# Patient Record
Sex: MALE | Race: WHITE | NOT HISPANIC OR LATINO | Employment: OTHER | URBAN - METROPOLITAN AREA
[De-identification: names, ages, dates, MRNs, and addresses within clinical notes are randomized per-mention and may not be internally consistent; named-entity substitution may affect disease eponyms.]

---

## 2023-03-07 LAB
ALANINE AMINOTRANSFERASE (SGPT) (U/L) IN SER/PLAS: 5 U/L (ref 10–52)
ALBUMIN (G/DL) IN SER/PLAS: 4.1 G/DL (ref 3.4–5)
ALKALINE PHOSPHATASE (U/L) IN SER/PLAS: 64 U/L (ref 33–136)
ANION GAP IN SER/PLAS: 16 MMOL/L (ref 10–20)
APPEARANCE, URINE: NORMAL
ASCORBIC ACID: NORMAL MG/DL
ASPARTATE AMINOTRANSFERASE (SGOT) (U/L) IN SER/PLAS: 15 U/L (ref 9–39)
BASOPHILS (10*3/UL) IN BLOOD BY AUTOMATED COUNT: 0.04 X10E9/L (ref 0–0.1)
BASOPHILS/100 LEUKOCYTES IN BLOOD BY AUTOMATED COUNT: 0.5 % (ref 0–2)
BILIRUBIN TOTAL (MG/DL) IN SER/PLAS: 0.6 MG/DL (ref 0–1.2)
BILIRUBIN, URINE: NORMAL
BLOOD, URINE: NORMAL
CALCIUM (MG/DL) IN SER/PLAS: 9.2 MG/DL (ref 8.6–10.3)
CARBON DIOXIDE, TOTAL (MMOL/L) IN SER/PLAS: 28 MMOL/L (ref 21–32)
CHLORIDE (MMOL/L) IN SER/PLAS: 101 MMOL/L (ref 98–107)
CHOLESTEROL (MG/DL) IN SER/PLAS: 155 MG/DL (ref 0–199)
CHOLESTEROL IN HDL (MG/DL) IN SER/PLAS: 69.2 MG/DL
CHOLESTEROL/HDL RATIO: 2.2
COLOR, URINE: NORMAL
CREATININE (MG/DL) IN SER/PLAS: 1.2 MG/DL (ref 0.5–1.3)
EOSINOPHILS (10*3/UL) IN BLOOD BY AUTOMATED COUNT: 0.19 X10E9/L (ref 0–0.4)
EOSINOPHILS/100 LEUKOCYTES IN BLOOD BY AUTOMATED COUNT: 2.5 % (ref 0–6)
ERYTHROCYTE DISTRIBUTION WIDTH (RATIO) BY AUTOMATED COUNT: 13.9 % (ref 11.5–14.5)
ERYTHROCYTE MEAN CORPUSCULAR HEMOGLOBIN CONCENTRATION (G/DL) BY AUTOMATED: 31.6 G/DL (ref 32–36)
ERYTHROCYTE MEAN CORPUSCULAR VOLUME (FL) BY AUTOMATED COUNT: 97 FL (ref 80–100)
ERYTHROCYTES (10*6/UL) IN BLOOD BY AUTOMATED COUNT: 4.44 X10E12/L (ref 4.5–5.9)
GFR MALE: 59 ML/MIN/1.73M2
GLUCOSE (MG/DL) IN SER/PLAS: 117 MG/DL (ref 74–99)
GLUCOSE, URINE: NORMAL
HEMATOCRIT (%) IN BLOOD BY AUTOMATED COUNT: 43 % (ref 41–52)
HEMOGLOBIN (G/DL) IN BLOOD: 13.6 G/DL (ref 13.5–17.5)
IMMATURE GRANULOCYTES/100 LEUKOCYTES IN BLOOD BY AUTOMATED COUNT: 0.1 % (ref 0–0.9)
KETONES, URINE: NORMAL
LDL: 70 MG/DL (ref 0–99)
LEUKOCYTE ESTERASE, URINE: NORMAL
LEUKOCYTES (10*3/UL) IN BLOOD BY AUTOMATED COUNT: 7.6 X10E9/L (ref 4.4–11.3)
LYMPHOCYTES (10*3/UL) IN BLOOD BY AUTOMATED COUNT: 1.16 X10E9/L (ref 0.8–3)
LYMPHOCYTES/100 LEUKOCYTES IN BLOOD BY AUTOMATED COUNT: 15.3 % (ref 13–44)
MONOCYTES (10*3/UL) IN BLOOD BY AUTOMATED COUNT: 0.62 X10E9/L (ref 0.05–0.8)
MONOCYTES/100 LEUKOCYTES IN BLOOD BY AUTOMATED COUNT: 8.2 % (ref 2–10)
NEUTROPHILS (10*3/UL) IN BLOOD BY AUTOMATED COUNT: 5.56 X10E9/L (ref 1.6–5.5)
NEUTROPHILS/100 LEUKOCYTES IN BLOOD BY AUTOMATED COUNT: 73.4 % (ref 40–80)
NITRITE, URINE: NORMAL
PH, URINE: NORMAL
PLATELETS (10*3/UL) IN BLOOD AUTOMATED COUNT: 269 X10E9/L (ref 150–450)
POTASSIUM (MMOL/L) IN SER/PLAS: 4.4 MMOL/L (ref 3.5–5.3)
PROSTATE SPECIFIC AG (NG/ML) IN SER/PLAS: 0.4 NG/ML (ref 0–4)
PROTEIN TOTAL: 6.1 G/DL (ref 6.4–8.2)
PROTEIN, URINE: NORMAL
SODIUM (MMOL/L) IN SER/PLAS: 141 MMOL/L (ref 136–145)
SPECIFIC GRAVITY, URINE: NORMAL
THYROTROPIN (MIU/L) IN SER/PLAS BY DETECTION LIMIT <= 0.05 MIU/L: 2.62 MIU/L (ref 0.44–3.98)
TRIGLYCERIDE (MG/DL) IN SER/PLAS: 80 MG/DL (ref 0–149)
UREA NITROGEN (MG/DL) IN SER/PLAS: 19 MG/DL (ref 6–23)
UROBILINOGEN, URINE: NORMAL
VLDL: 16 MG/DL (ref 0–40)

## 2023-06-14 LAB
ALBUMIN (MG/L) IN URINE: 110.8 MG/L
ALBUMIN/CREATININE (UG/MG) IN URINE: 118.1 UG/MG CRT (ref 0–30)
CREATININE (MG/DL) IN URINE: 93.8 MG/DL (ref 20–370)

## 2023-09-19 LAB
ALANINE AMINOTRANSFERASE (SGPT) (U/L) IN SER/PLAS: 5 U/L (ref 10–52)
ALBUMIN (G/DL) IN SER/PLAS: 4.2 G/DL (ref 3.4–5)
ALKALINE PHOSPHATASE (U/L) IN SER/PLAS: 64 U/L (ref 33–136)
ANION GAP IN SER/PLAS: 12 MMOL/L (ref 10–20)
ASPARTATE AMINOTRANSFERASE (SGOT) (U/L) IN SER/PLAS: 15 U/L (ref 9–39)
BILIRUBIN TOTAL (MG/DL) IN SER/PLAS: 0.7 MG/DL (ref 0–1.2)
CALCIUM (MG/DL) IN SER/PLAS: 9.2 MG/DL (ref 8.6–10.3)
CARBON DIOXIDE, TOTAL (MMOL/L) IN SER/PLAS: 29 MMOL/L (ref 21–32)
CHLORIDE (MMOL/L) IN SER/PLAS: 106 MMOL/L (ref 98–107)
CHOLESTEROL (MG/DL) IN SER/PLAS: 160 MG/DL (ref 0–199)
CHOLESTEROL IN HDL (MG/DL) IN SER/PLAS: 71.3 MG/DL
CHOLESTEROL/HDL RATIO: 2.2
CREATININE (MG/DL) IN SER/PLAS: 1.23 MG/DL (ref 0.5–1.3)
GFR MALE: 57 ML/MIN/1.73M2
GLUCOSE (MG/DL) IN SER/PLAS: 148 MG/DL (ref 74–99)
LDL: 74 MG/DL (ref 0–99)
POTASSIUM (MMOL/L) IN SER/PLAS: 4.4 MMOL/L (ref 3.5–5.3)
PROTEIN TOTAL: 6.3 G/DL (ref 6.4–8.2)
SODIUM (MMOL/L) IN SER/PLAS: 143 MMOL/L (ref 136–145)
TRIGLYCERIDE (MG/DL) IN SER/PLAS: 74 MG/DL (ref 0–149)
UREA NITROGEN (MG/DL) IN SER/PLAS: 22 MG/DL (ref 6–23)
VLDL: 15 MG/DL (ref 0–40)

## 2023-10-23 DIAGNOSIS — E11.65 TYPE 2 DIABETES MELLITUS WITH HYPERGLYCEMIA, WITHOUT LONG-TERM CURRENT USE OF INSULIN (MULTI): Primary | ICD-10-CM

## 2023-10-23 RX ORDER — GLIPIZIDE 2.5 MG/1
TABLET, EXTENDED RELEASE ORAL
Qty: 270 TABLET | Refills: 1 | Status: SHIPPED | OUTPATIENT
Start: 2023-10-23 | End: 2024-04-25

## 2023-11-01 RX ORDER — SIMVASTATIN 10 MG/1
1 TABLET, FILM COATED ORAL NIGHTLY
COMMUNITY
Start: 2008-05-21 | End: 2024-01-15 | Stop reason: SDUPTHER

## 2023-11-01 RX ORDER — METFORMIN HYDROCHLORIDE 500 MG/1
500 TABLET ORAL
COMMUNITY
Start: 2009-08-11 | End: 2024-02-27

## 2023-11-01 RX ORDER — MUPIROCIN 20 MG/G
OINTMENT TOPICAL
COMMUNITY
Start: 2023-09-08

## 2023-11-01 RX ORDER — SULFAMETHOXAZOLE AND TRIMETHOPRIM 400; 80 MG/1; MG/1
TABLET ORAL
COMMUNITY
Start: 2023-09-05 | End: 2023-12-13 | Stop reason: ALTCHOICE

## 2023-11-01 RX ORDER — BLOOD-GLUCOSE METER
EACH MISCELLANEOUS
COMMUNITY
Start: 2017-12-04 | End: 2024-04-01

## 2023-11-01 RX ORDER — CARBIDOPA AND LEVODOPA 25; 100 MG/1; MG/1
1 TABLET ORAL 4 TIMES DAILY
COMMUNITY
Start: 2019-06-19

## 2023-11-01 RX ORDER — CHOLECALCIFEROL (VITAMIN D3) 25 MCG
TABLET ORAL
COMMUNITY
Start: 2011-12-19

## 2023-11-01 RX ORDER — LISINOPRIL 10 MG/1
10 TABLET ORAL 2 TIMES DAILY
COMMUNITY
Start: 2015-02-18 | End: 2024-01-04 | Stop reason: SDUPTHER

## 2023-11-02 ENCOUNTER — OFFICE VISIT (OUTPATIENT)
Dept: PODIATRY | Facility: CLINIC | Age: 88
End: 2023-11-02
Payer: MEDICARE

## 2023-11-02 DIAGNOSIS — M79.675 TOE PAIN, LEFT: ICD-10-CM

## 2023-11-02 DIAGNOSIS — M79.674 TOE PAIN, RIGHT: ICD-10-CM

## 2023-11-02 DIAGNOSIS — E11.65 POORLY CONTROLLED DIABETES MELLITUS (MULTI): ICD-10-CM

## 2023-11-02 DIAGNOSIS — B35.1 TINEA UNGUIUM: Primary | ICD-10-CM

## 2023-11-02 PROCEDURE — 1036F TOBACCO NON-USER: CPT | Performed by: PODIATRIST

## 2023-11-02 PROCEDURE — 11721 DEBRIDE NAIL 6 OR MORE: CPT | Performed by: PODIATRIST

## 2023-11-02 PROCEDURE — 1160F RVW MEDS BY RX/DR IN RCRD: CPT | Performed by: PODIATRIST

## 2023-11-02 PROCEDURE — 1159F MED LIST DOCD IN RCRD: CPT | Performed by: PODIATRIST

## 2023-11-02 NOTE — PROGRESS NOTES
CC:  painful thickened and elongated toenails and diabetic care.     HPI: Pt presents for dm foot exam and painful thickened and elongated toenails that are difficult to manage.  Onset was gradual with worsening course until recently.  Aggravated by shoe gear and ambulation.       PCP: Dr. Semaan  Last visit: 9-18-23     PMH  History reviewed. No pertinent past medical history.  MEDS    Current Outpatient Medications:     blood sugar diagnostic (OneTouch Verio test strips) strip, TEST TWO TIMES A DAY NON-INSULIN  PER DIRECTED, Disp: , Rfl:     carbidopa-levodopa (Sinemet)  mg tablet, Take 1 tablet by mouth 4 times a day. TAKE PER DIRECTED, Disp: , Rfl:     cholecalciferol (Vitamin D-3) 25 MCG (1000 UT) tablet, Take by mouth. TAKE PER DIRECTED, Disp: , Rfl:     glipiZIDE XL (Glucotrol XL) 2.5 mg 24 hr tablet, 1 TAB PO Q AM AND #2 TABS PO AT SUPPER.  Do not crush, chew, or split., Disp: 270 tablet, Rfl: 1    lisinopril 10 mg tablet, Take 1 tablet (10 mg) by mouth 2 times a day. TAKE PER DIRECTED, Disp: , Rfl:     metFORMIN (Glucophage) 500 mg tablet, Take 1 tablet (500 mg) by mouth. TAKE PER DIRECTED, Disp: , Rfl:     multivitamin capsule, Take 1 capsule by mouth once daily., Disp: , Rfl:     mupirocin (Bactroban) 2 % ointment, Apply topically. APPLY PER DIRECTED, Disp: , Rfl:     simvastatin (Zocor) 10 mg tablet, Take 1 tablet (10 mg) by mouth once daily at bedtime., Disp: , Rfl:     sulfamethoxazole-trimethoprim (Bactrim) 400-80 mg tablet, Take by mouth. TAKE PER DIRECTED, Disp: , Rfl:   Allergies  No Known Allergies  Social History     Socioeconomic History    Marital status:      Spouse name: None    Number of children: None    Years of education: None    Highest education level: None   Occupational History    None   Tobacco Use    Smoking status: Never    Smokeless tobacco: Never   Substance and Sexual Activity    Alcohol use: Not Currently    Drug use: Never    Sexual activity: None   Other Topics  Concern    None   Social History Narrative    None     Social Determinants of Health     Financial Resource Strain: Not on file   Food Insecurity: Not on file   Transportation Needs: Not on file   Physical Activity: Not on file   Stress: Not on file   Social Connections: Not on file   Intimate Partner Violence: Not on file   Housing Stability: Not on file     Family History   Problem Relation Name Age of Onset    No Known Problems Other       History reviewed. No pertinent surgical history.    REVIEW OF SYSTEMS  DERM:   + as noted in HPI.       Physical examination:   On General Observation: Patient is a pleasant, cooperative, well developed 88 y.o. diabetic   adult male. The patient is alert and oriented to time, place and person. Patient has normal affect and mood.  There were no vitals taken for this visit.    Vascular:  DP and PT pulses are 2/4 b/l.  mild edema noted. mild varicosities b/l.  CFT  5 seconds to all digits bilateral.  Skin temperature is warm to warm from proximal to distal bilateral.      Muscular: Strength is 5/5 for all instrinsic and extrinsic muscle groups.     Neuro:  Proprioception present.   Sensation to vibration is  present. Protective sensation present  at all pedal sites via [place] Jyotsna 5.07 monofilament bilateral.  Light touch intact bilateral.     Derm:    Left toenails: 1-5 Brittleness, crumbling upon debridement, subungual debris, elongation, mycotic appearance, tenderness, and thickness.   Right toenails: 1-5 Brittleness, crumbling upon debridement, subungual debris, elongation, mycotic appearance, tenderness, and thickness.   Hair growth is decreased b/l le    ASSESSMENT:    Tinea Unguium [B35.1]   E11.65  Pain in right toe(s) [M79.674]   Pain in left toe(s) [M79.675]       PLAN:   A comprehensive history and physical examination were preformed. DM foot care and DM foot manifestations were reviewed.  The patient was educated on clinical findings, diagnosis and treatment  plans. Patient understands all that has been explained and all questions were answered to apparent satisfaction.     - Debrided toenails 1-10 in length and height.   - Follow up in 9-12 weeks.       Gerson Castellanos DPM

## 2023-12-13 ENCOUNTER — OFFICE VISIT (OUTPATIENT)
Dept: PRIMARY CARE | Facility: CLINIC | Age: 88
End: 2023-12-13
Payer: MEDICARE

## 2023-12-13 VITALS
RESPIRATION RATE: 16 BRPM | TEMPERATURE: 97 F | SYSTOLIC BLOOD PRESSURE: 122 MMHG | DIASTOLIC BLOOD PRESSURE: 80 MMHG | HEART RATE: 70 BPM | WEIGHT: 145 LBS

## 2023-12-13 DIAGNOSIS — G20.B2 PARKINSON'S DISEASE WITH DYSKINESIA AND FLUCTUATING MANIFESTATIONS (MULTI): ICD-10-CM

## 2023-12-13 DIAGNOSIS — I10 ESSENTIAL HYPERTENSION: ICD-10-CM

## 2023-12-13 DIAGNOSIS — E11.9 TYPE 2 DIABETES MELLITUS WITHOUT COMPLICATION, WITHOUT LONG-TERM CURRENT USE OF INSULIN (MULTI): Primary | ICD-10-CM

## 2023-12-13 LAB
POC FINGERSTICK BLOOD GLUCOSE: 155 MG/DL (ref 70–100)
POC HEMOGLOBIN A1C: 7.4 % (ref 4.2–6.5)

## 2023-12-13 PROCEDURE — 99213 OFFICE O/P EST LOW 20 MIN: CPT | Performed by: NURSE PRACTITIONER

## 2023-12-13 PROCEDURE — 1159F MED LIST DOCD IN RCRD: CPT | Performed by: NURSE PRACTITIONER

## 2023-12-13 PROCEDURE — 3079F DIAST BP 80-89 MM HG: CPT | Performed by: NURSE PRACTITIONER

## 2023-12-13 PROCEDURE — 82962 GLUCOSE BLOOD TEST: CPT | Performed by: NURSE PRACTITIONER

## 2023-12-13 PROCEDURE — 1036F TOBACCO NON-USER: CPT | Performed by: NURSE PRACTITIONER

## 2023-12-13 PROCEDURE — 1160F RVW MEDS BY RX/DR IN RCRD: CPT | Performed by: NURSE PRACTITIONER

## 2023-12-13 PROCEDURE — 3074F SYST BP LT 130 MM HG: CPT | Performed by: NURSE PRACTITIONER

## 2023-12-13 PROCEDURE — 83036 HEMOGLOBIN GLYCOSYLATED A1C: CPT | Performed by: NURSE PRACTITIONER

## 2023-12-13 RX ORDER — ASPIRIN 81 MG/1
81 TABLET ORAL DAILY
COMMUNITY

## 2023-12-13 ASSESSMENT — ENCOUNTER SYMPTOMS
ENDOCRINE NEGATIVE: 1
PSYCHIATRIC NEGATIVE: 1
GASTROINTESTINAL NEGATIVE: 1
CARDIOVASCULAR NEGATIVE: 1
CONSTITUTIONAL NEGATIVE: 1
NEUROLOGICAL NEGATIVE: 1
RESPIRATORY NEGATIVE: 1

## 2023-12-13 NOTE — PROGRESS NOTES
Seb Arias is a 88 y.o. here for a diabetic follow up exam.     Pt states they are doing well. Following a low carbohydrate diet and is active.     Up to date with eye and foot exams, pcp visits.     Last aic - 6.6  Goal aic advanced age - 7-8  Last wt 149  Last bmi 24.8   Urine micro slight c/w age 88 - 6/23    Meds:  Glipizide XL 2.5mg po qam  Metformin 500mg one po in am, 3 at supper    Dr soriano eye care and utd  Dr staples here podiatry care and utd  Pcp q6mo and utd  Parkinsonism - NP - Dr Campos in past - every 6mo  They has spoken in past re: strengthening PT program  Wife says he is on occas soft fall d/t foot getting caught, etc. Never syncopal  Will have him start PT    Aic 7.4%  Fbs 155  Fbs - 119, 122, 98, 128, 145, 231, 195, 116  Pp supper - 124, 178, 200x1    Lab Results   Component Value Date    POCGLU 155 (A) 12/13/2023    HGBA1C 7.4 (A) 12/13/2023    HGBA1C 7.5 02/23/2021    HGBA1C 7.5 08/26/2019        Vitals:    12/13/23 0852   Weight: 65.8 kg (145 lb)        Visit Vitals  /80   Pulse 70   Temp 36.1 °C (97 °F)   Resp 16        Lab Results   Component Value Date    HGBA1C 7.5 02/23/2021    HGBA1C 7.5 08/26/2019          Current Outpatient Medications on File Prior to Visit   Medication Sig Dispense Refill    blood sugar diagnostic (OneTouch Verio test strips) strip TEST TWO TIMES A DAY NON-INSULIN  PER DIRECTED      carbidopa-levodopa (Sinemet)  mg tablet Take 1 tablet by mouth 4 times a day. TAKE PER DIRECTED      cholecalciferol (Vitamin D-3) 25 MCG (1000 UT) tablet Take by mouth. TAKE PER DIRECTED      glipiZIDE XL (Glucotrol XL) 2.5 mg 24 hr tablet 1 TAB PO Q AM AND #2 TABS PO AT SUPPER.  Do not crush, chew, or split. 270 tablet 1    lisinopril 10 mg tablet Take 1 tablet (10 mg) by mouth 2 times a day. TAKE PER DIRECTED      metFORMIN (Glucophage) 500 mg tablet Take 1 tablet (500 mg) by mouth. TAKE PER DIRECTED      multivitamin capsule Take 1 capsule by mouth once daily.       mupirocin (Bactroban) 2 % ointment Apply topically. APPLY PER DIRECTED      simvastatin (Zocor) 10 mg tablet Take 1 tablet (10 mg) by mouth once daily at bedtime.      sulfamethoxazole-trimethoprim (Bactrim) 400-80 mg tablet Take by mouth. TAKE PER DIRECTED       No current facility-administered medications on file prior to visit.      Review of Systems   Constitutional: Negative.    Respiratory: Negative.     Cardiovascular: Negative.    Gastrointestinal: Negative.    Endocrine: Negative.    Skin: Negative.    Neurological: Negative.    Psychiatric/Behavioral: Negative.          Physical Exam  Vitals and nursing note reviewed.   Constitutional:       Appearance: Normal appearance.   HENT:      Head: Normocephalic.   Eyes:      Pupils: Pupils are equal, round, and reactive to light.   Cardiovascular:      Rate and Rhythm: Normal rate and regular rhythm.      Heart sounds: Normal heart sounds.   Pulmonary:      Effort: Pulmonary effort is normal.      Breath sounds: Normal breath sounds.   Skin:     General: Skin is warm and dry.   Neurological:      General: No focal deficit present.      Mental Status: He is alert and oriented to person, place, and time. Mental status is at baseline.   Psychiatric:         Mood and Affect: Mood normal.         Behavior: Behavior normal.         Thought Content: Thought content normal.         Judgment: Judgment normal.        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

## 2024-01-03 ENCOUNTER — EVALUATION (OUTPATIENT)
Dept: PHYSICAL THERAPY | Facility: CLINIC | Age: 89
End: 2024-01-03
Payer: MEDICARE

## 2024-01-03 DIAGNOSIS — G20.B2 PARKINSON'S DISEASE WITH DYSKINESIA AND FLUCTUATING MANIFESTATIONS (MULTI): Primary | ICD-10-CM

## 2024-01-03 PROCEDURE — 97116 GAIT TRAINING THERAPY: CPT | Performed by: PHYSICAL THERAPIST

## 2024-01-03 PROCEDURE — 97162 PT EVAL MOD COMPLEX 30 MIN: CPT | Performed by: PHYSICAL THERAPIST

## 2024-01-03 PROCEDURE — 97110 THERAPEUTIC EXERCISES: CPT | Performed by: PHYSICAL THERAPIST

## 2024-01-03 NOTE — PROGRESS NOTES
Physical Therapy     Physical Therapy Evaluation and Treatment      Patient Name:  Seb Arias   1935     Encounter Diagnosis   Name Primary?    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 LAURA SEAVER NP     SUBJECTIVE:        I DEVELOPED PARKINSONS  ABOUT 3-4 YEARS AGO AND IM HAVING DIFFICULTY WALKING AND GENERALLY MOVING AROUND--SOME FALLS     GOALS:   IMPROVE MY OVER ALL SAFETY AND WALKING     OBJECTIVE:   SHUFFLE GAIT PATTERN----LEG STRENGTH-3/5---GENERAL DEBILITY---FALLING OCCASSIONALLY     ASSESSMENT: DEBILITY WITH ALL GAIT    PLAN AND TREATMENT:  SEE FLOW SHEET PROGRAM IN CHART:    1 X WEEKLY

## 2024-01-04 ENCOUNTER — OFFICE VISIT (OUTPATIENT)
Dept: PODIATRY | Facility: CLINIC | Age: 89
End: 2024-01-04
Payer: MEDICARE

## 2024-01-04 DIAGNOSIS — M79.675 TOE PAIN, LEFT: ICD-10-CM

## 2024-01-04 DIAGNOSIS — M79.674 TOE PAIN, RIGHT: ICD-10-CM

## 2024-01-04 DIAGNOSIS — I10 PRIMARY HYPERTENSION: Primary | ICD-10-CM

## 2024-01-04 DIAGNOSIS — B35.1 TINEA UNGUIUM: Primary | ICD-10-CM

## 2024-01-04 DIAGNOSIS — E11.65 POORLY CONTROLLED DIABETES MELLITUS (MULTI): ICD-10-CM

## 2024-01-04 PROCEDURE — 11721 DEBRIDE NAIL 6 OR MORE: CPT | Performed by: PODIATRIST

## 2024-01-04 PROCEDURE — 1036F TOBACCO NON-USER: CPT | Performed by: PODIATRIST

## 2024-01-04 RX ORDER — LISINOPRIL 10 MG/1
10 TABLET ORAL 2 TIMES DAILY
Qty: 180 TABLET | Refills: 3 | Status: SHIPPED | OUTPATIENT
Start: 2024-01-04

## 2024-01-08 ENCOUNTER — TREATMENT (OUTPATIENT)
Dept: PHYSICAL THERAPY | Facility: CLINIC | Age: 89
End: 2024-01-08
Payer: MEDICARE

## 2024-01-08 DIAGNOSIS — G20.B2 PARKINSON'S DISEASE WITH DYSKINESIA AND FLUCTUATING MANIFESTATIONS (MULTI): Primary | ICD-10-CM

## 2024-01-08 PROCEDURE — 97110 THERAPEUTIC EXERCISES: CPT | Performed by: PHYSICAL THERAPIST

## 2024-01-08 PROCEDURE — 97140 MANUAL THERAPY 1/> REGIONS: CPT | Performed by: PHYSICAL THERAPIST

## 2024-01-08 PROCEDURE — 97116 GAIT TRAINING THERAPY: CPT | Performed by: PHYSICAL THERAPIST

## 2024-01-08 NOTE — PROGRESS NOTES
Physical Therapy     Physical Therapy Evaluation and Treatment      Patient Name:   Seb Arias      1935   Encounter Diagnosis   Name Primary?    Parkinson's disease with dyskinesia and fluctuating manifestations Yes        THERAPY VISIT NUMBER:     2    Today's Date:  1-8-24    REFERRING DR. LAURA SEAVER NP     SUBJECTIVE:        STILL HAVING WALKING DIFFICULTY     GOALS:  NO FALLS AND INCREASE MY WALKING     OBJECTIVE: SEE FLOW SHEET     ASSESSMENT: DEBILITY    PLAN AND TREATMENT:  SEE FLOW SHEET PROGRAM IN CHART:    1 X WEEKLY

## 2024-01-15 ENCOUNTER — TELEPHONE (OUTPATIENT)
Dept: PRIMARY CARE | Facility: CLINIC | Age: 89
End: 2024-01-15
Payer: MEDICARE

## 2024-01-15 DIAGNOSIS — E78.2 MIXED HYPERLIPIDEMIA: Primary | ICD-10-CM

## 2024-01-15 RX ORDER — SIMVASTATIN 10 MG/1
10 TABLET, FILM COATED ORAL NIGHTLY
Qty: 90 TABLET | Refills: 1 | Status: SHIPPED | OUTPATIENT
Start: 2024-01-15 | End: 2024-01-16 | Stop reason: SDUPTHER

## 2024-01-16 DIAGNOSIS — E78.2 MIXED HYPERLIPIDEMIA: ICD-10-CM

## 2024-01-16 RX ORDER — SIMVASTATIN 10 MG/1
10 TABLET, FILM COATED ORAL NIGHTLY
Qty: 90 TABLET | Refills: 1 | Status: SHIPPED | OUTPATIENT
Start: 2024-01-16

## 2024-01-16 NOTE — PROGRESS NOTES
Subjective   Patient ID: Seb Arias is a 88 y.o. male who presents for No chief complaint on file..  HPI    Review of Systems    Objective   Physical Exam    Assessment/Plan              .VS

## 2024-01-17 ENCOUNTER — APPOINTMENT (OUTPATIENT)
Dept: PHYSICAL THERAPY | Facility: CLINIC | Age: 89
End: 2024-01-17
Payer: MEDICARE

## 2024-01-18 ENCOUNTER — TREATMENT (OUTPATIENT)
Dept: PHYSICAL THERAPY | Facility: CLINIC | Age: 89
End: 2024-01-18
Payer: MEDICARE

## 2024-01-18 DIAGNOSIS — G20.B2 PARKINSON'S DISEASE WITH DYSKINESIA AND FLUCTUATING MANIFESTATIONS (MULTI): Primary | ICD-10-CM

## 2024-01-18 PROCEDURE — 97110 THERAPEUTIC EXERCISES: CPT | Performed by: PHYSICAL THERAPIST

## 2024-01-18 PROCEDURE — 97116 GAIT TRAINING THERAPY: CPT | Performed by: PHYSICAL THERAPIST

## 2024-01-18 PROCEDURE — 97140 MANUAL THERAPY 1/> REGIONS: CPT | Performed by: PHYSICAL THERAPIST

## 2024-01-18 NOTE — PROGRESS NOTES
Physical Therapy     Physical Therapy Evaluation and Treatment      Patient Name:  Seb Arias   1935     Encounter Diagnosis   Name Primary?    Parkinson's disease with dyskinesia and fluctuating manifestations Yes        THERAPY VISIT NUMBER:   3    Today's Date: 1-18-24    REFERRING DR. LAURA SEAVER NP     SUBJECTIVE:          MAKING PROGRESS---DOING A HOME PROGRAM     GOALS:    DEBILITY WITH WALKING --PARKINSONS     OBJECTIVE: SEE FLOW SHEET     ASSESSMENT:  MAKING PROGRESS    PLAN AND TREATMENT:  SEE FLOW SHEET PROGRAM IN CHART:    1 X WEEKLY

## 2024-01-24 ENCOUNTER — TREATMENT (OUTPATIENT)
Dept: PHYSICAL THERAPY | Facility: CLINIC | Age: 89
End: 2024-01-24
Payer: MEDICARE

## 2024-01-24 DIAGNOSIS — G20.B2 PARKINSON'S DISEASE WITH DYSKINESIA AND FLUCTUATING MANIFESTATIONS (MULTI): Primary | ICD-10-CM

## 2024-01-24 PROCEDURE — 97110 THERAPEUTIC EXERCISES: CPT | Performed by: PHYSICAL THERAPIST

## 2024-01-24 PROCEDURE — 97116 GAIT TRAINING THERAPY: CPT | Performed by: PHYSICAL THERAPIST

## 2024-01-31 ENCOUNTER — TREATMENT (OUTPATIENT)
Dept: PHYSICAL THERAPY | Facility: CLINIC | Age: 89
End: 2024-01-31
Payer: MEDICARE

## 2024-01-31 DIAGNOSIS — G20.B2 PARKINSON'S DISEASE WITH DYSKINESIA AND FLUCTUATING MANIFESTATIONS (MULTI): Primary | ICD-10-CM

## 2024-01-31 PROCEDURE — 97110 THERAPEUTIC EXERCISES: CPT | Performed by: PHYSICAL THERAPIST

## 2024-01-31 PROCEDURE — 97116 GAIT TRAINING THERAPY: CPT | Performed by: PHYSICAL THERAPIST

## 2024-01-31 PROCEDURE — 97140 MANUAL THERAPY 1/> REGIONS: CPT | Performed by: PHYSICAL THERAPIST

## 2024-01-31 NOTE — PROGRESS NOTES
Physical Therapy     Physical Therapy Evaluation and Treatment      Patient Name:  Seb Arias   1935     Encounter Diagnosis   Name Primary?    Parkinson's disease with dyskinesia and fluctuating manifestations Yes        THERAPY VISIT NUMBER:    5    Today's Date: 1-31-24    REFERRING DR. LAURA SEAVER NP     SUBJECTIVE:        I FEEL STRONGER IN MY WALKING ABILITIES     GOALS: SEE FLOW SHEET     OBJECTIVE:  MAKING INCREASED GAINS     ASSESSMENT: SEE ABOVE    PLAN AND TREATMENT:  SEE FLOW SHEET PROGRAM IN CHART:    1 X WEEKLY

## 2024-02-02 ENCOUNTER — TELEPHONE (OUTPATIENT)
Dept: PRIMARY CARE | Facility: CLINIC | Age: 89
End: 2024-02-02
Payer: MEDICARE

## 2024-02-02 DIAGNOSIS — E11.9 TYPE 2 DIABETES MELLITUS WITHOUT COMPLICATION, WITHOUT LONG-TERM CURRENT USE OF INSULIN (MULTI): Primary | ICD-10-CM

## 2024-02-02 RX ORDER — BLOOD-GLUCOSE CONTROL, NORMAL
100 EACH MISCELLANEOUS DAILY
Qty: 100 EACH | Refills: 1 | Status: SHIPPED | OUTPATIENT
Start: 2024-02-02 | End: 2024-02-07 | Stop reason: SDUPTHER

## 2024-02-02 RX ORDER — BLOOD-GLUCOSE CONTROL, NORMAL
100 EACH MISCELLANEOUS
COMMUNITY
End: 2024-02-02 | Stop reason: SDUPTHER

## 2024-02-07 DIAGNOSIS — E11.9 TYPE 2 DIABETES MELLITUS WITHOUT COMPLICATION, WITHOUT LONG-TERM CURRENT USE OF INSULIN (MULTI): ICD-10-CM

## 2024-02-07 RX ORDER — BLOOD-GLUCOSE CONTROL, NORMAL
100 EACH MISCELLANEOUS DAILY
Qty: 100 EACH | Refills: 3 | Status: SHIPPED | OUTPATIENT
Start: 2024-02-07

## 2024-02-07 RX ORDER — BLOOD-GLUCOSE CONTROL, NORMAL
100 EACH MISCELLANEOUS DAILY
Qty: 100 EACH | Refills: 3 | Status: SHIPPED | OUTPATIENT
Start: 2024-02-07 | End: 2024-02-07 | Stop reason: SDUPTHER

## 2024-02-07 NOTE — TELEPHONE ENCOUNTER
Pharmacy needing more info on script due to his insurance, I just added it on there can you resend please? Thank you

## 2024-02-08 ENCOUNTER — TREATMENT (OUTPATIENT)
Dept: PHYSICAL THERAPY | Facility: CLINIC | Age: 89
End: 2024-02-08
Payer: MEDICARE

## 2024-02-08 DIAGNOSIS — G20.B2 PARKINSON'S DISEASE WITH DYSKINESIA AND FLUCTUATING MANIFESTATIONS (MULTI): Primary | ICD-10-CM

## 2024-02-08 PROCEDURE — 97140 MANUAL THERAPY 1/> REGIONS: CPT | Performed by: PHYSICAL THERAPIST

## 2024-02-08 PROCEDURE — 97116 GAIT TRAINING THERAPY: CPT | Performed by: PHYSICAL THERAPIST

## 2024-02-08 PROCEDURE — 97110 THERAPEUTIC EXERCISES: CPT | Performed by: PHYSICAL THERAPIST

## 2024-02-08 NOTE — PROGRESS NOTES
Physical Therapy     Physical Therapy Evaluation and Treatment      Patient Name:  Seb Arias     1935   Encounter Diagnosis   Name Primary?    Parkinson's disease with dyskinesia and fluctuating manifestations Yes        THERAPY VISIT NUMBER:   6    Today's Date: 2-8-24    REFERRING DR. LAURA SEAVER NP     SUBJECTIVE:      TODAY WE WALKED OUTSIDE WITH ARM ON SHOULDER AND HAND---MOD ASST X1 ---WE WALKED TO THE MOVIE THEATER AND BACK       GOALS:  SEE ABOVE     OBJECTIVE: SEE FLOW SHEET     ASSESSMENT:  SEE FLOW SHEET    PLAN AND TREATMENT:  SEE FLOW SHEET PROGRAM IN CHART:    1 X WEEKLY

## 2024-02-15 ENCOUNTER — TREATMENT (OUTPATIENT)
Dept: PHYSICAL THERAPY | Facility: CLINIC | Age: 89
End: 2024-02-15
Payer: MEDICARE

## 2024-02-15 DIAGNOSIS — G20.B2 PARKINSON'S DISEASE WITH DYSKINESIA AND FLUCTUATING MANIFESTATIONS (MULTI): Primary | ICD-10-CM

## 2024-02-15 PROCEDURE — 97110 THERAPEUTIC EXERCISES: CPT | Performed by: PHYSICAL THERAPIST

## 2024-02-15 PROCEDURE — 97140 MANUAL THERAPY 1/> REGIONS: CPT | Performed by: PHYSICAL THERAPIST

## 2024-02-15 PROCEDURE — 97116 GAIT TRAINING THERAPY: CPT | Performed by: PHYSICAL THERAPIST

## 2024-02-15 NOTE — PROGRESS NOTES
Physical Therapy     Physical Therapy Evaluation and Treatment      Patient Name:  Seb Arias   1935     Encounter Diagnosis   Name Primary?    Parkinson's disease with dyskinesia and fluctuating manifestations Yes        THERAPY VISIT NUMBER:    7    Today's Date:  2-15-24    REFERRING DR. LAURA SEAVER NP     SUBJECTIVE:       GAIT DEBILITY---WITH PARKINSONS     GOALS:    CONTINUE EXERCISE AND STRENGHTENING     OBJECTIVE:   SEE FLOW SHEET     ASSESSMENT: DEBILITY    PLAN AND TREATMENT:  SEE FLOW SHEET PROGRAM IN CHART:    1 X WEEKLY

## 2024-02-26 DIAGNOSIS — E11.9 TYPE 2 DIABETES MELLITUS WITHOUT COMPLICATION, WITHOUT LONG-TERM CURRENT USE OF INSULIN (MULTI): Primary | ICD-10-CM

## 2024-02-27 RX ORDER — METFORMIN HYDROCHLORIDE 500 MG/1
TABLET ORAL
Qty: 360 TABLET | Refills: 3 | Status: SHIPPED | OUTPATIENT
Start: 2024-02-27

## 2024-02-29 ENCOUNTER — TREATMENT (OUTPATIENT)
Dept: PHYSICAL THERAPY | Facility: CLINIC | Age: 89
End: 2024-02-29
Payer: MEDICARE

## 2024-02-29 DIAGNOSIS — G20.B2 PARKINSON'S DISEASE WITH DYSKINESIA AND FLUCTUATING MANIFESTATIONS (MULTI): Primary | ICD-10-CM

## 2024-02-29 PROCEDURE — 97116 GAIT TRAINING THERAPY: CPT | Performed by: PHYSICAL THERAPIST

## 2024-02-29 PROCEDURE — 97110 THERAPEUTIC EXERCISES: CPT | Performed by: PHYSICAL THERAPIST

## 2024-02-29 PROCEDURE — 97140 MANUAL THERAPY 1/> REGIONS: CPT | Performed by: PHYSICAL THERAPIST

## 2024-02-29 NOTE — PROGRESS NOTES
Physical Therapy     Physical Therapy Evaluation and Treatment      Patient Name:  Seb Arias   1935      Encounter Diagnosis   Name Primary?    Parkinson's disease with dyskinesia and fluctuating manifestations Yes        THERAPY VISIT NUMBER:   8/10---2 more visits and then a home program    Today's Date: 2-29-24    REFERRING DR. LAURA SEAVER NP     SUBJECTIVE:        DEBILITY WITH SHUFFLE GAIT     GOALS: SEE ABOVE--LESS SHUFFLING      OBJECTIVE: SEE FLOW SHEET     ASSESSMENT: PARKINSONS GAIT    PLAN AND TREATMENT:  SEE FLOW SHEET PROGRAM IN CHART:    1 X WEEKLY FOR 2 MORE VISITS

## 2024-03-07 ENCOUNTER — TREATMENT (OUTPATIENT)
Dept: PHYSICAL THERAPY | Facility: CLINIC | Age: 89
End: 2024-03-07
Payer: MEDICARE

## 2024-03-07 ENCOUNTER — PROCEDURE VISIT (OUTPATIENT)
Dept: PODIATRY | Facility: CLINIC | Age: 89
End: 2024-03-07
Payer: MEDICARE

## 2024-03-07 DIAGNOSIS — M79.674 TOE PAIN, RIGHT: Primary | ICD-10-CM

## 2024-03-07 DIAGNOSIS — G20.B2 PARKINSON'S DISEASE WITH DYSKINESIA AND FLUCTUATING MANIFESTATIONS (MULTI): Primary | ICD-10-CM

## 2024-03-07 DIAGNOSIS — M79.675 TOE PAIN, LEFT: ICD-10-CM

## 2024-03-07 DIAGNOSIS — E11.65 POORLY CONTROLLED DIABETES MELLITUS (MULTI): ICD-10-CM

## 2024-03-07 DIAGNOSIS — B35.1 TINEA UNGUIUM: ICD-10-CM

## 2024-03-07 PROCEDURE — 11721 DEBRIDE NAIL 6 OR MORE: CPT | Performed by: PODIATRIST

## 2024-03-07 PROCEDURE — 97116 GAIT TRAINING THERAPY: CPT | Performed by: PHYSICAL THERAPIST

## 2024-03-07 PROCEDURE — 97110 THERAPEUTIC EXERCISES: CPT | Performed by: PHYSICAL THERAPIST

## 2024-03-07 NOTE — PROGRESS NOTES
CC:  painful thickened and elongated toenails and diabetic care.     HPI: Pt presents for dm foot exam and painful thickened and elongated toenails that are difficult to manage.  Onset was gradual with worsening course until recently.  Aggravated by shoe gear and ambulation.       PCP: Laura Seaver CNP  Last visit: 12-13-23     PMH  Past Medical History:   Diagnosis Date    Diabetes (CMS/[facility])      MEDS    Current Outpatient Medications:     aspirin 81 mg EC tablet, Take 1 tablet (81 mg) by mouth once daily., Disp: , Rfl:     blood sugar diagnostic (OneTouch Verio test strips) strip, TEST TWO TIMES A DAY NON-INSULIN  PER DIRECTED, Disp: , Rfl:     carbidopa-levodopa (Sinemet)  mg tablet, Take 1 tablet by mouth 4 times a day. TAKE PER DIRECTED, Disp: , Rfl:     cholecalciferol (Vitamin D-3) 25 MCG (1000 UT) tablet, Take by mouth. TAKE PER DIRECTED, Disp: , Rfl:     glipiZIDE XL (Glucotrol XL) 2.5 mg 24 hr tablet, 1 TAB PO Q AM AND #2 TABS PO AT SUPPER.  Do not crush, chew, or split., Disp: 270 tablet, Rfl: 1    lancets 30 gauge misc, 100 Lancets once daily., Disp: 100 each, Rfl: 3    lisinopril 10 mg tablet, Take 1 tablet (10 mg) by mouth 2 times a day. TAKE PER DIRECTED, Disp: 180 tablet, Rfl: 3    metFORMIN (Glucophage) 500 mg tablet, TAKE ONE TABLET BY MOUTH EVERY MORNING AND TAKE THREE TABLETS WITH SUPPER, Disp: 360 tablet, Rfl: 3    multivitamin capsule, Take 1 capsule by mouth once daily., Disp: , Rfl:     mupirocin (Bactroban) 2 % ointment, Apply topically. APPLY PER DIRECTED, Disp: , Rfl:     simvastatin (Zocor) 10 mg tablet, Take 1 tablet (10 mg) by mouth once daily at bedtime., Disp: 90 tablet, Rfl: 1  Allergies  No Known Allergies  Social History     Socioeconomic History    Marital status:      Spouse name: None    Number of children: None    Years of education: None    Highest education level: None   Occupational History    None   Tobacco Use    Smoking status: Never    Smokeless tobacco:  Never   Substance and Sexual Activity    Alcohol use: Not Currently    Drug use: Never    Sexual activity: None   Other Topics Concern    None   Social History Narrative    None     Social Determinants of Health     Financial Resource Strain: Not on file   Food Insecurity: Not on file   Transportation Needs: Not on file   Physical Activity: Not on file   Stress: Not on file   Social Connections: Not on file   Intimate Partner Violence: Not on file   Housing Stability: Not on file     Family History   Problem Relation Name Age of Onset    No Known Problems Other       No past surgical history on file.    REVIEW OF SYSTEMS  DERM:   + as noted in HPI.       Physical examination:   On General Observation: Patient is a pleasant, cooperative, well developed 88 y.o. diabetic   adult male. The patient is alert and oriented to time, place and person. Patient has normal affect and mood.  There were no vitals taken for this visit.    Vascular:  DP and PT pulses are 1-2/4 b/l.  mild edema noted. mild varicosities b/l.  CFT  6 seconds to all digits bilateral.  Skin temperature is warm to warm from proximal to distal bilateral.      Muscular: Strength is 5/5 for all instrinsic and extrinsic muscle groups.     Neuro:  Proprioception present.   Sensation to vibration is  present. Protective sensation present  at all pedal sites via [place] Jyotsna 5.07 monofilament bilateral.  Light touch present bilateral.     Derm:    Left toenails: 1-5 Brittleness, crumbling upon debridement, subungual debris, elongation, mycotic appearance, tenderness, and thickness.   Right toenails: 1-5 Brittleness, crumbling upon debridement, subungual debris, elongation, mycotic appearance, tenderness, and thickness.   Hair growth is decreased b/l le    ASSESSMENT:    Tinea Unguium [B35.1]   E11.65  Pain in right toe(s) [M79.674]   Pain in left toe(s) [M79.675]       PLAN:   DM foot care and DM foot manifestations were reviewed.  The patient was educated  on clinical findings, diagnosis and treatment plans. Patient understands all that has been explained and all questions were answered to apparent satisfaction.     - Debrided toenails 1-10 in length and height.   - Follow up in 9-12 weeks.       Gerson Castellanos DPM

## 2024-03-07 NOTE — PROGRESS NOTES
Physical Therapy      Physical Therapy Treatment      Patient Name: Seb Arias     MRN: [id number]     Today's Date: 3/7/24     REFERRING DR Seaman       SUBJECTIVE:  pt is doing better, his legs are stronger and so is his gait endurance             GOALS:  Safe ambulation and no falls          OBJECTIVE:  full tx now able to be on nustep for 33 min            ASSESSMENT: pt did well, his parkinsons gives him a flat affect , but he is stronger in terms of endurance. But his processing and body awareness have not changed, he is still at risk for falls, wife states he struggles to get out of rocking chair, but can get up from a standard arm chair. He follows [facility] but will benefit from PT in the future as he is very rigid in  all of his movements .          PLAN/TREATMENT/VISIT:     1 more visit planned

## 2024-03-14 ENCOUNTER — TREATMENT (OUTPATIENT)
Dept: PHYSICAL THERAPY | Facility: CLINIC | Age: 89
End: 2024-03-14
Payer: MEDICARE

## 2024-03-14 DIAGNOSIS — G20.B2 PARKINSON'S DISEASE WITH DYSKINESIA AND FLUCTUATING MANIFESTATIONS (MULTI): Primary | ICD-10-CM

## 2024-03-14 PROCEDURE — 97116 GAIT TRAINING THERAPY: CPT | Performed by: PHYSICAL THERAPIST

## 2024-03-14 PROCEDURE — 97110 THERAPEUTIC EXERCISES: CPT | Performed by: PHYSICAL THERAPIST

## 2024-03-14 PROCEDURE — 97140 MANUAL THERAPY 1/> REGIONS: CPT | Performed by: PHYSICAL THERAPIST

## 2024-03-14 NOTE — PROGRESS NOTES
Physical Therapy     Physical Therapy Evaluation and Treatment      Patient Name:   Seb Arias    1935      Encounter Diagnosis   Name Primary?    Parkinson's disease with dyskinesia and fluctuating manifestations Yes        THERAPY VISIT NUMBER:    10    Today's Date: 3-14-24    REFERRING DR. LAURA SEAVER NP     SUBJECTIVE:        PLAN---LAST VISIT TODAY---HE WILL NEED TO COME LATER THIS YEAR AGAIN---- SEE FLOW SHEET----     GOALS:    STILL HAVING PARKINSONS  ISSUES     OBJECTIVE:   SEE FLOW SHEET     ASSESSMENT:  DEBILITY    PLAN AND TREATMENT:  SEE FLOW SHEET PROGRAM IN CHART:    HOLD THERAPY---TO HEP----AND COME BACK LATER THIS YEAR

## 2024-03-25 ENCOUNTER — TELEPHONE (OUTPATIENT)
Dept: PRIMARY CARE | Facility: CLINIC | Age: 89
End: 2024-03-25
Payer: MEDICARE

## 2024-03-25 DIAGNOSIS — Z12.5 PROSTATE CANCER SCREENING: ICD-10-CM

## 2024-03-25 DIAGNOSIS — I10 PRIMARY HYPERTENSION: ICD-10-CM

## 2024-03-25 DIAGNOSIS — E11.9 TYPE 2 DIABETES MELLITUS WITHOUT COMPLICATION, WITHOUT LONG-TERM CURRENT USE OF INSULIN (MULTI): ICD-10-CM

## 2024-03-25 DIAGNOSIS — Z00.00 WELLNESS EXAMINATION: Primary | ICD-10-CM

## 2024-03-25 NOTE — TELEPHONE ENCOUNTER
Patient is calling in asking if you can you put in lab orders for him he has an appointment on 04/01/2024. Thank you.

## 2024-03-27 ENCOUNTER — LAB (OUTPATIENT)
Dept: LAB | Facility: LAB | Age: 89
End: 2024-03-27
Payer: MEDICARE

## 2024-03-27 DIAGNOSIS — Z00.00 WELLNESS EXAMINATION: ICD-10-CM

## 2024-03-27 DIAGNOSIS — N30.00 ACUTE CYSTITIS WITHOUT HEMATURIA: Primary | ICD-10-CM

## 2024-03-27 DIAGNOSIS — E11.9 TYPE 2 DIABETES MELLITUS WITHOUT COMPLICATION, WITHOUT LONG-TERM CURRENT USE OF INSULIN (MULTI): ICD-10-CM

## 2024-03-27 DIAGNOSIS — Z12.5 PROSTATE CANCER SCREENING: ICD-10-CM

## 2024-03-27 DIAGNOSIS — I10 PRIMARY HYPERTENSION: ICD-10-CM

## 2024-03-27 LAB
ALBUMIN SERPL BCP-MCNC: 4.1 G/DL (ref 3.4–5)
ALP SERPL-CCNC: 69 U/L (ref 33–136)
ALT SERPL W P-5'-P-CCNC: 3 U/L (ref 10–52)
ANION GAP SERPL CALC-SCNC: 9 MMOL/L (ref 10–20)
APPEARANCE UR: CLEAR
AST SERPL W P-5'-P-CCNC: 13 U/L (ref 9–39)
BILIRUB SERPL-MCNC: 0.6 MG/DL (ref 0–1.2)
BILIRUB UR STRIP.AUTO-MCNC: NEGATIVE MG/DL
BUN SERPL-MCNC: 27 MG/DL (ref 6–23)
CALCIUM SERPL-MCNC: 8.9 MG/DL (ref 8.6–10.3)
CHLORIDE SERPL-SCNC: 107 MMOL/L (ref 98–107)
CHOLEST SERPL-MCNC: 154 MG/DL (ref 0–199)
CHOLESTEROL/HDL RATIO: 2.2
CO2 SERPL-SCNC: 30 MMOL/L (ref 21–32)
COLOR UR: ABNORMAL
CREAT SERPL-MCNC: 1.25 MG/DL (ref 0.5–1.3)
EGFRCR SERPLBLD CKD-EPI 2021: 56 ML/MIN/1.73M*2
ERYTHROCYTE [DISTWIDTH] IN BLOOD BY AUTOMATED COUNT: 14.3 % (ref 11.5–14.5)
GLUCOSE SERPL-MCNC: 185 MG/DL (ref 74–99)
GLUCOSE UR STRIP.AUTO-MCNC: NORMAL MG/DL
HCT VFR BLD AUTO: 42.4 % (ref 41–52)
HDLC SERPL-MCNC: 70 MG/DL
HGB BLD-MCNC: 13.2 G/DL (ref 13.5–17.5)
HOLD SPECIMEN: NORMAL
KETONES UR STRIP.AUTO-MCNC: NEGATIVE MG/DL
LDLC SERPL CALC-MCNC: 71 MG/DL
LEUKOCYTE ESTERASE UR QL STRIP.AUTO: ABNORMAL
MCH RBC QN AUTO: 31.3 PG (ref 26–34)
MCHC RBC AUTO-ENTMCNC: 31.1 G/DL (ref 32–36)
MCV RBC AUTO: 101 FL (ref 80–100)
NITRITE UR QL STRIP.AUTO: NEGATIVE
NON HDL CHOLESTEROL: 84 MG/DL (ref 0–149)
NRBC BLD-RTO: 0 /100 WBCS (ref 0–0)
PH UR STRIP.AUTO: 5.5 [PH]
PLATELET # BLD AUTO: 241 X10*3/UL (ref 150–450)
POTASSIUM SERPL-SCNC: 4.4 MMOL/L (ref 3.5–5.3)
PROT SERPL-MCNC: 6.1 G/DL (ref 6.4–8.2)
PROT UR STRIP.AUTO-MCNC: ABNORMAL MG/DL
PSA SERPL-MCNC: 0.47 NG/ML
RBC # BLD AUTO: 4.22 X10*6/UL (ref 4.5–5.9)
RBC # UR STRIP.AUTO: NEGATIVE /UL
RBC #/AREA URNS AUTO: NORMAL /HPF
SODIUM SERPL-SCNC: 142 MMOL/L (ref 136–145)
SP GR UR STRIP.AUTO: 1.02
TRIGL SERPL-MCNC: 65 MG/DL (ref 0–149)
TSH SERPL-ACNC: 2.86 MIU/L (ref 0.44–3.98)
UROBILINOGEN UR STRIP.AUTO-MCNC: NORMAL MG/DL
VLDL: 13 MG/DL (ref 0–40)
WBC # BLD AUTO: 7.7 X10*3/UL (ref 4.4–11.3)
WBC #/AREA URNS AUTO: NORMAL /HPF

## 2024-03-27 PROCEDURE — 87186 SC STD MICRODIL/AGAR DIL: CPT

## 2024-03-27 PROCEDURE — 84443 ASSAY THYROID STIM HORMONE: CPT

## 2024-03-27 PROCEDURE — 36415 COLL VENOUS BLD VENIPUNCTURE: CPT

## 2024-03-27 PROCEDURE — 81001 URINALYSIS AUTO W/SCOPE: CPT

## 2024-03-27 PROCEDURE — 85027 COMPLETE CBC AUTOMATED: CPT

## 2024-03-27 PROCEDURE — G0103 PSA SCREENING: HCPCS

## 2024-03-27 PROCEDURE — 80053 COMPREHEN METABOLIC PANEL: CPT

## 2024-03-27 PROCEDURE — 80061 LIPID PANEL: CPT

## 2024-03-27 PROCEDURE — 87086 URINE CULTURE/COLONY COUNT: CPT

## 2024-03-29 RX ORDER — CEPHALEXIN 250 MG/1
250 CAPSULE ORAL 4 TIMES DAILY
Qty: 28 CAPSULE | Refills: 0 | Status: SHIPPED | OUTPATIENT
Start: 2024-03-29 | End: 2024-04-05

## 2024-03-30 DIAGNOSIS — E11.9 TYPE 2 DIABETES MELLITUS WITHOUT COMPLICATION, WITHOUT LONG-TERM CURRENT USE OF INSULIN (MULTI): Primary | ICD-10-CM

## 2024-04-01 ENCOUNTER — OFFICE VISIT (OUTPATIENT)
Dept: PRIMARY CARE | Facility: CLINIC | Age: 89
End: 2024-04-01
Payer: MEDICARE

## 2024-04-01 VITALS
OXYGEN SATURATION: 95 % | TEMPERATURE: 96.9 F | SYSTOLIC BLOOD PRESSURE: 122 MMHG | BODY MASS INDEX: 24.26 KG/M2 | HEIGHT: 65 IN | WEIGHT: 145.6 LBS | DIASTOLIC BLOOD PRESSURE: 80 MMHG | HEART RATE: 75 BPM

## 2024-04-01 DIAGNOSIS — R01.1 HEART MURMUR: ICD-10-CM

## 2024-04-01 DIAGNOSIS — N30.00 ACUTE CYSTITIS WITHOUT HEMATURIA: ICD-10-CM

## 2024-04-01 DIAGNOSIS — E11.9 TYPE 2 DIABETES MELLITUS WITHOUT COMPLICATION, WITHOUT LONG-TERM CURRENT USE OF INSULIN (MULTI): ICD-10-CM

## 2024-04-01 DIAGNOSIS — G20.B2 PARKINSON'S DISEASE WITH DYSKINESIA AND FLUCTUATING MANIFESTATIONS (MULTI): ICD-10-CM

## 2024-04-01 DIAGNOSIS — E78.2 MIXED HYPERLIPIDEMIA: ICD-10-CM

## 2024-04-01 DIAGNOSIS — Z00.00 MEDICARE ANNUAL WELLNESS VISIT, SUBSEQUENT: Primary | ICD-10-CM

## 2024-04-01 DIAGNOSIS — N64.4 MASTODYNIA OF RIGHT BREAST: ICD-10-CM

## 2024-04-01 DIAGNOSIS — D64.9 ANEMIA, UNSPECIFIED TYPE: ICD-10-CM

## 2024-04-01 DIAGNOSIS — I10 ESSENTIAL HYPERTENSION: ICD-10-CM

## 2024-04-01 DIAGNOSIS — Z12.5 PROSTATE CANCER SCREENING: ICD-10-CM

## 2024-04-01 DIAGNOSIS — M81.8 OTHER OSTEOPOROSIS, UNSPECIFIED PATHOLOGICAL FRACTURE PRESENCE: ICD-10-CM

## 2024-04-01 LAB
BACTERIA UR CULT: ABNORMAL
POC HEMOGLOBIN A1C: 7.6 % (ref 4.2–6.5)

## 2024-04-01 PROCEDURE — G0009 ADMIN PNEUMOCOCCAL VACCINE: HCPCS | Performed by: INTERNAL MEDICINE

## 2024-04-01 PROCEDURE — 83036 HEMOGLOBIN GLYCOSYLATED A1C: CPT | Performed by: INTERNAL MEDICINE

## 2024-04-01 PROCEDURE — 3074F SYST BP LT 130 MM HG: CPT | Performed by: INTERNAL MEDICINE

## 2024-04-01 PROCEDURE — 99215 OFFICE O/P EST HI 40 MIN: CPT | Performed by: INTERNAL MEDICINE

## 2024-04-01 PROCEDURE — G0439 PPPS, SUBSEQ VISIT: HCPCS | Performed by: INTERNAL MEDICINE

## 2024-04-01 PROCEDURE — 90732 PPSV23 VACC 2 YRS+ SUBQ/IM: CPT | Performed by: INTERNAL MEDICINE

## 2024-04-01 PROCEDURE — 1159F MED LIST DOCD IN RCRD: CPT | Performed by: INTERNAL MEDICINE

## 2024-04-01 PROCEDURE — 1160F RVW MEDS BY RX/DR IN RCRD: CPT | Performed by: INTERNAL MEDICINE

## 2024-04-01 PROCEDURE — 1170F FXNL STATUS ASSESSED: CPT | Performed by: INTERNAL MEDICINE

## 2024-04-01 PROCEDURE — 3079F DIAST BP 80-89 MM HG: CPT | Performed by: INTERNAL MEDICINE

## 2024-04-01 RX ORDER — BLOOD-GLUCOSE METER
EACH MISCELLANEOUS
Qty: 200 STRIP | Refills: 3 | Status: SHIPPED | OUTPATIENT
Start: 2024-04-01

## 2024-04-01 ASSESSMENT — ENCOUNTER SYMPTOMS
ENDOCRINE NEGATIVE: 1
FEVER: 0
APPETITE CHANGE: 0
ARTHRALGIAS: 0
GASTROINTESTINAL NEGATIVE: 1
FATIGUE: 0
HEMATOLOGIC/LYMPHATIC NEGATIVE: 1
EYE ITCHING: 0
HEADACHES: 0
ALLERGIC/IMMUNOLOGIC NEGATIVE: 1
STRIDOR: 0
FACIAL ASYMMETRY: 0
LIGHT-HEADEDNESS: 0
UNEXPECTED WEIGHT CHANGE: 0
WHEEZING: 0
DIAPHORESIS: 0
DIZZINESS: 0
SHORTNESS OF BREATH: 0
APNEA: 0
CHILLS: 0
NUMBNESS: 0
CHOKING: 0
PALPITATIONS: 0
COUGH: 0
EYE DISCHARGE: 0
ACTIVITY CHANGE: 0
PSYCHIATRIC NEGATIVE: 1
EYE REDNESS: 0
EYE PAIN: 0
PHOTOPHOBIA: 0
CHEST TIGHTNESS: 0

## 2024-04-01 ASSESSMENT — ACTIVITIES OF DAILY LIVING (ADL)
DRESSING: INDEPENDENT
BATHING: INDEPENDENT
DOING_HOUSEWORK: INDEPENDENT
GROCERY_SHOPPING: INDEPENDENT
TAKING_MEDICATION: INDEPENDENT
MANAGING_FINANCES: INDEPENDENT

## 2024-04-01 ASSESSMENT — PATIENT HEALTH QUESTIONNAIRE - PHQ9
2. FEELING DOWN, DEPRESSED OR HOPELESS: NOT AT ALL
SUM OF ALL RESPONSES TO PHQ9 QUESTIONS 1 AND 2: 0
1. LITTLE INTEREST OR PLEASURE IN DOING THINGS: NOT AT ALL

## 2024-04-01 NOTE — PROGRESS NOTES
Subjective   Reason for Visit: Seb Arias is an 88 y.o. male here for a Medicare Wellness visit.        PMHX, PSHX, ALL, SOCHX, PRE MED ALL REVIEWED   Reviewed all medications by prescribing practitioner or clinical pharmacist (such as prescriptions, OTCs, herbal therapies and supplements) and documented in the medical record.    HPI  Review all medications by prescribing practitioner or clinical pharmacist (such as prescriptions, OTCs, herbal therapies and supplements) documented in the medical record    Past Medical, Surgical, and Family History reviewed and updated in chart     Tobacco Use Reviewed    Alcohol Use Reviewed  Y   Illicit Drug Use Reviewed  Y   PHQ2/9  Y   Falls in Last Year Reviewed  Y   Home Safety Risk Factors Reviewed  Y   Cognitive Impairment Reviewed  Y   Patient Self Assessment and Health Status  Y   Current Diet Reviewed  Y   Exercise Frequency  Y   ADL - Hearing Impairment  Y   ADL - Bathing  Y   ADL - Dressing  Y   ADL - Walks in Home  Y   IADL - Managing Finances  Y   IADL - Grocery Shopping  Y   IADL - Taking Medications  Y   IADL - Doing Housework Y   SP TRIP FALL   NOW FEELS WELL   SEE LABS   SP UTI ON MEDS  ALSO RIGHT BREAST SOME PAIN    Patient Care Team:  Iggy Seaman MD as PCP - General  Laura L Seaver, APRN-CNP as PCP - [facility] Attributed Provider     Review of Systems   Constitutional:  Negative for activity change, appetite change, chills, diaphoresis, fatigue, fever and unexpected weight change.   HENT: Negative.     Eyes:  Negative for photophobia, pain, discharge, redness, itching and visual disturbance.   Respiratory:  Negative for apnea, cough, choking, chest tightness, shortness of breath, wheezing and stridor.    Cardiovascular:  Negative for chest pain, palpitations and leg swelling.   Gastrointestinal: Negative.    Endocrine: Negative.    Genitourinary: Negative.         UTI   Musculoskeletal:  Negative for arthralgias.   Skin: Negative.   "  Allergic/Immunologic: Negative.    Neurological:  Negative for dizziness, facial asymmetry, light-headedness, numbness and headaches.        PARKINSONS   Hematological: Negative.    Psychiatric/Behavioral: Negative.         Objective   Vitals:  /80   Pulse 75   Temp 36.1 °C (96.9 °F)   Ht 1.651 m (5' 5\")   Wt 66 kg (145 lb 9.6 oz)   SpO2 95%   BMI 24.23 kg/m²       Physical Exam  Constitutional:       Appearance: Normal appearance.   HENT:      Head: Normocephalic and atraumatic.      Right Ear: Tympanic membrane normal.      Left Ear: Tympanic membrane normal.      Nose: Nose normal.   Eyes:      Extraocular Movements: Extraocular movements intact.      Conjunctiva/sclera: Conjunctivae normal.      Pupils: Pupils are equal, round, and reactive to light.   Cardiovascular:      Rate and Rhythm: Normal rate and regular rhythm.      Pulses: Normal pulses.      Heart sounds: Normal heart sounds.   Pulmonary:      Effort: Pulmonary effort is normal.      Breath sounds: Normal breath sounds.   Abdominal:      General: Abdomen is flat. Bowel sounds are normal.      Palpations: Abdomen is soft.   Genitourinary:     Penis: Normal.       Testes: Normal.      Prostate: Normal.   Musculoskeletal:         General: Normal range of motion.      Cervical back: Normal range of motion and neck supple.   Skin:     General: Skin is warm and dry.   Neurological:      General: No focal deficit present.      Mental Status: Mental status is at baseline.   Psychiatric:         Mood and Affect: Mood normal.         Behavior: Behavior normal.         Thought Content: Thought content normal.         Judgment: Judgment normal.     FULLNESS UNDER RIGHT BREAST     Assessment/Plan   Problem List Items Addressed This Visit       Essential hypertension    Relevant Orders    POCT glycosylated hemoglobin (Hb A1C) manually resulted    Heart murmur    Relevant Orders    POCT glycosylated hemoglobin (Hb A1C) manually resulted    " Hyperlipidemia    Relevant Orders    POCT glycosylated hemoglobin (Hb A1C) manually resulted    Osteoporosis    Relevant Orders    POCT glycosylated hemoglobin (Hb A1C) manually resulted    Type 2 diabetes mellitus (CMS/[facility])    Relevant Orders    POCT glycosylated hemoglobin (Hb A1C) manually resulted    Prostate cancer screening    Relevant Orders    POCT glycosylated hemoglobin (Hb A1C) manually resulted    Parkinson's disease with dyskinesia and fluctuating manifestations (CMS/[facility])    Relevant Orders    POCT glycosylated hemoglobin (Hb A1C) manually resulted    Acute cystitis without hematuria    Relevant Orders    POCT glycosylated hemoglobin (Hb A1C) manually resulted    Anemia     Other Visit Diagnoses       Medicare annual wellness visit, subsequent    -  Primary    Relevant Orders    POCT glycosylated hemoglobin (Hb A1C) manually resulted

## 2024-04-05 ENCOUNTER — DOCUMENTATION (OUTPATIENT)
Dept: PHYSICAL THERAPY | Facility: CLINIC | Age: 89
End: 2024-04-05
Payer: MEDICARE

## 2024-04-05 DIAGNOSIS — G20.B2 PARKINSON'S DISEASE WITH DYSKINESIA AND FLUCTUATING MANIFESTATIONS (MULTI): Primary | ICD-10-CM

## 2024-04-05 NOTE — PROGRESS NOTES
Physical Therapy    Discharge Summary    Name: Seb Arias    Encounter Diagnosis   Name Primary?    Parkinson's disease with dyskinesia and fluctuating manifestations (CMS/HCC) Yes      MRN: [id number]  : 1935  Date: 24        Rehab Discharge Reason: Progress plateaued; further improvement possible   BASIL VARGAS ---WE SAW YOUR PATIENT FOR 10 PT VISITS---HE WILL GO ON HOLD FOR SEVERAL MONTHS--AND THEN COME BACK LATER THIS YEAR---PLAN--D/C AT THIS TIME

## 2024-04-11 ENCOUNTER — HOSPITAL ENCOUNTER (OUTPATIENT)
Dept: RADIOLOGY | Facility: CLINIC | Age: 89
Discharge: HOME | End: 2024-04-11
Payer: MEDICARE

## 2024-04-11 DIAGNOSIS — N64.4 MASTODYNIA OF RIGHT BREAST: ICD-10-CM

## 2024-04-11 PROCEDURE — G0279 TOMOSYNTHESIS, MAMMO: HCPCS | Performed by: STUDENT IN AN ORGANIZED HEALTH CARE EDUCATION/TRAINING PROGRAM

## 2024-04-11 PROCEDURE — 77066 DX MAMMO INCL CAD BI: CPT | Performed by: STUDENT IN AN ORGANIZED HEALTH CARE EDUCATION/TRAINING PROGRAM

## 2024-04-11 PROCEDURE — 76642 ULTRASOUND BREAST LIMITED: CPT | Performed by: STUDENT IN AN ORGANIZED HEALTH CARE EDUCATION/TRAINING PROGRAM

## 2024-04-11 PROCEDURE — 77062 BREAST TOMOSYNTHESIS BI: CPT

## 2024-04-11 PROCEDURE — 76642 ULTRASOUND BREAST LIMITED: CPT | Mod: RT

## 2024-04-25 DIAGNOSIS — E11.65 TYPE 2 DIABETES MELLITUS WITH HYPERGLYCEMIA, WITHOUT LONG-TERM CURRENT USE OF INSULIN (MULTI): ICD-10-CM

## 2024-04-25 RX ORDER — GLIPIZIDE 2.5 MG/1
TABLET, EXTENDED RELEASE ORAL
Qty: 270 TABLET | Refills: 1 | Status: SHIPPED | OUTPATIENT
Start: 2024-04-25

## 2024-05-09 ENCOUNTER — PROCEDURE VISIT (OUTPATIENT)
Dept: PODIATRY | Facility: CLINIC | Age: 89
End: 2024-05-09
Payer: MEDICARE

## 2024-05-09 DIAGNOSIS — E11.65 POORLY CONTROLLED DIABETES MELLITUS (MULTI): ICD-10-CM

## 2024-05-09 DIAGNOSIS — B35.1 TINEA UNGUIUM: Primary | ICD-10-CM

## 2024-05-09 DIAGNOSIS — M79.674 TOE PAIN, RIGHT: ICD-10-CM

## 2024-05-09 DIAGNOSIS — M79.675 TOE PAIN, LEFT: ICD-10-CM

## 2024-05-09 PROCEDURE — 11721 DEBRIDE NAIL 6 OR MORE: CPT | Performed by: PODIATRIST

## 2024-05-09 NOTE — PROGRESS NOTES
CC:  painful thickened and elongated toenails and diabetic care.      HPI: Pt presents for dm foot exam and painful thickened and elongated toenails that are difficult to manage.  Onset was gradual with worsening course until recently.  Aggravated by shoe gear and ambulation.         PCP: Dr. Seaman  Last visit: 4-1-24     PMH  Medical History        Past Medical History:   Diagnosis Date    Diabetes (CMS/[facility])           MEDS     Current Outpatient Medications:     aspirin 81 mg EC tablet, Take 1 tablet (81 mg) by mouth once daily., Disp: , Rfl:     blood sugar diagnostic (OneTouch Verio test strips) strip, TEST TWO TIMES A DAY NON-INSULIN  PER DIRECTED, Disp: , Rfl:     carbidopa-levodopa (Sinemet)  mg tablet, Take 1 tablet by mouth 4 times a day. TAKE PER DIRECTED, Disp: , Rfl:     cholecalciferol (Vitamin D-3) 25 MCG (1000 UT) tablet, Take by mouth. TAKE PER DIRECTED, Disp: , Rfl:     glipiZIDE XL (Glucotrol XL) 2.5 mg 24 hr tablet, 1 TAB PO Q AM AND #2 TABS PO AT SUPPER.  Do not crush, chew, or split., Disp: 270 tablet, Rfl: 1    lancets 30 gauge misc, 100 Lancets once daily., Disp: 100 each, Rfl: 3    lisinopril 10 mg tablet, Take 1 tablet (10 mg) by mouth 2 times a day. TAKE PER DIRECTED, Disp: 180 tablet, Rfl: 3    metFORMIN (Glucophage) 500 mg tablet, TAKE ONE TABLET BY MOUTH EVERY MORNING AND TAKE THREE TABLETS WITH SUPPER, Disp: 360 tablet, Rfl: 3    multivitamin capsule, Take 1 capsule by mouth once daily., Disp: , Rfl:     mupirocin (Bactroban) 2 % ointment, Apply topically. APPLY PER DIRECTED, Disp: , Rfl:     simvastatin (Zocor) 10 mg tablet, Take 1 tablet (10 mg) by mouth once daily at bedtime., Disp: 90 tablet, Rfl: 1  Allergies  No Known Allergies  Social History   Social History            Socioeconomic History    Marital status:        Spouse name: None    Number of children: None    Years of education: None    Highest education level: None   Occupational History    None   Tobacco Use     Smoking status: Never    Smokeless tobacco: Never   Substance and Sexual Activity    Alcohol use: Not Currently    Drug use: Never    Sexual activity: None   Other Topics Concern    None   Social History Narrative    None      Social Determinants of Health      Financial Resource Strain: Not on file   Food Insecurity: Not on file   Transportation Needs: Not on file   Physical Activity: Not on file   Stress: Not on file   Social Connections: Not on file   Intimate Partner Violence: Not on file   Housing Stability: Not on file         Family History          Family History   Problem Relation Name Age of Onset    No Known Problems Other             Surgical History   No past surgical history on file.        REVIEW OF SYSTEMS  DERM:   + as noted in HPI.         Physical examination:   On General Observation: Patient is a pleasant, cooperative, well developed 88 y.o. diabetic   adult male. The patient is alert and oriented to time, place and person. Patient has normal affect and mood.  There were no vitals taken for this visit.     Vascular:  DP and PT pulses are 1-2/4 b/l.  mild edema noted. mild varicosities b/l.  CFT  6 seconds to all digits bilateral.  Skin temperature is warm to warm from proximal to distal bilateral.       Muscular: Strength is 5/5 for all instrinsic and extrinsic muscle groups.      Neuro:  Proprioception present.   Sensation to vibration is  present. Protective sensation present  at all pedal sites via [place] 5.07 monofilament bilateral.  Light touch present bilateral.      Derm:    Left toenails: 1-5 Brittleness, crumbling upon debridement, subungual debris, elongation, mycotic appearance, tenderness, and thickness.   Right toenails: 1-5 Brittleness, crumbling upon debridement, subungual debris, elongation, mycotic appearance, tenderness, and thickness.   Hair growth is decreased b/l le     ASSESSMENT:    Tinea Unguium [B35.1]   E11.65  Pain in right toe(s) [M79.674]   Pain in left  toe(s) [M79.655]         PLAN:   DM foot care and DM foot manifestations were reviewed.  The patient was educated on clinical findings, diagnosis and treatment plans. Patient understands all that has been explained and all questions were answered to apparent satisfaction.      - Debrided toenails 1-10 in length and height.   - Follow up in 9-12 weeks.         Gerson Castellanos DPM

## 2024-06-21 DIAGNOSIS — E78.2 MIXED HYPERLIPIDEMIA: ICD-10-CM

## 2024-06-21 RX ORDER — SIMVASTATIN 10 MG/1
10 TABLET, FILM COATED ORAL NIGHTLY
Qty: 90 TABLET | Refills: 1 | Status: SHIPPED | OUTPATIENT
Start: 2024-06-21

## 2024-07-11 ENCOUNTER — APPOINTMENT (OUTPATIENT)
Dept: PODIATRY | Facility: CLINIC | Age: 89
End: 2024-07-11
Payer: MEDICARE

## 2024-07-11 DIAGNOSIS — M79.675 TOE PAIN, LEFT: ICD-10-CM

## 2024-07-11 DIAGNOSIS — B35.1 TINEA UNGUIUM: Primary | ICD-10-CM

## 2024-07-11 DIAGNOSIS — E11.65 POORLY CONTROLLED DIABETES MELLITUS (MULTI): ICD-10-CM

## 2024-07-11 DIAGNOSIS — M79.674 TOE PAIN, RIGHT: ICD-10-CM

## 2024-07-11 PROCEDURE — 11721 DEBRIDE NAIL 6 OR MORE: CPT | Performed by: PODIATRIST

## 2024-07-11 NOTE — PROGRESS NOTES
CC:  painful thickened and elongated toenails and diabetic care.      HPI: Pt presents for dm foot exam and painful thickened and elongated toenails that are difficult to manage.  Onset was gradual with worsening course until recently.  Aggravated by shoe gear and ambulation.         PCP: Dr. Seaman  Last visit: 4-1-24     PMH  Medical History           Past Medical History:   Diagnosis Date    Diabetes (CMS/[facility])           MEDS     Current Outpatient Medications:     aspirin 81 mg EC tablet, Take 1 tablet (81 mg) by mouth once daily., Disp: , Rfl:     blood sugar diagnostic (OneTouch Verio test strips) strip, TEST TWO TIMES A DAY NON-INSULIN  PER DIRECTED, Disp: , Rfl:     carbidopa-levodopa (Sinemet)  mg tablet, Take 1 tablet by mouth 4 times a day. TAKE PER DIRECTED, Disp: , Rfl:     cholecalciferol (Vitamin D-3) 25 MCG (1000 UT) tablet, Take by mouth. TAKE PER DIRECTED, Disp: , Rfl:     glipiZIDE XL (Glucotrol XL) 2.5 mg 24 hr tablet, 1 TAB PO Q AM AND #2 TABS PO AT SUPPER.  Do not crush, chew, or split., Disp: 270 tablet, Rfl: 1    lancets 30 gauge misc, 100 Lancets once daily., Disp: 100 each, Rfl: 3    lisinopril 10 mg tablet, Take 1 tablet (10 mg) by mouth 2 times a day. TAKE PER DIRECTED, Disp: 180 tablet, Rfl: 3    metFORMIN (Glucophage) 500 mg tablet, TAKE ONE TABLET BY MOUTH EVERY MORNING AND TAKE THREE TABLETS WITH SUPPER, Disp: 360 tablet, Rfl: 3    multivitamin capsule, Take 1 capsule by mouth once daily., Disp: , Rfl:     mupirocin (Bactroban) 2 % ointment, Apply topically. APPLY PER DIRECTED, Disp: , Rfl:     simvastatin (Zocor) 10 mg tablet, Take 1 tablet (10 mg) by mouth once daily at bedtime., Disp: 90 tablet, Rfl: 1  Allergies  No Known Allergies  Social History   Social History                Socioeconomic History    Marital status:        Spouse name: None    Number of children: None    Years of education: None    Highest education level: None   Occupational History    None    Tobacco Use    Smoking status: Never    Smokeless tobacco: Never   Substance and Sexual Activity    Alcohol use: Not Currently    Drug use: Never    Sexual activity: None   Other Topics Concern    None   Social History Narrative    None      Social Determinants of Health      Financial Resource Strain: Not on file   Food Insecurity: Not on file   Transportation Needs: Not on file   Physical Activity: Not on file   Stress: Not on file   Social Connections: Not on file   Intimate Partner Violence: Not on file   Housing Stability: Not on file         Family History               Family History   Problem Relation Name Age of Onset    No Known Problems Other             Surgical History   No past surgical history on file.         REVIEW OF SYSTEMS  DERM:   + as noted in HPI.         Physical examination:   On General Observation: Patient is a pleasant, cooperative, well developed 88 y.o. diabetic   adult male. The patient is alert and oriented to time, place and person. Patient has normal affect and mood.  There were no vitals taken for this visit.     Vascular:  DP and PT pulses are 1-2/4 b/l.  mild edema noted. mild varicosities b/l.  CFT  6 seconds to all digits bilateral.  Skin temperature is warm to warm from proximal to distal bilateral.       Muscular: Strength is 5/5 for all instrinsic and extrinsic muscle groups.      Neuro:  Proprioception present.   Sensation to vibration is  present. Protective sensation present  at all pedal sites via [place] 5.07 monofilament bilateral.  Light touch present bilateral.      Derm:    Left toenails: 1-5 Brittleness, crumbling upon debridement, subungual debris, elongation, mycotic appearance, tenderness, and thickness.   Right toenails: 1-5 Brittleness, crumbling upon debridement, subungual debris, elongation, mycotic appearance, tenderness, and thickness.   Hair growth is decreased b/l le     ASSESSMENT:    Tinea Unguium [B35.1]   E11.65  Pain in right toe(s)  [M58.394]   Pain in left toe(s) [M79.335]         PLAN:   DM foot care and DM foot manifestations were reviewed.  The patient was educated on clinical findings, diagnosis and treatment plans. Patient understands all that has been explained and all questions were answered to apparent satisfaction.      - Debrided toenails 1-10 in length and height.   - Follow up in 9-12 weeks.         Gerson Castellanos DPM

## 2024-07-12 ENCOUNTER — APPOINTMENT (OUTPATIENT)
Dept: PRIMARY CARE | Facility: CLINIC | Age: 89
End: 2024-07-12
Payer: COMMERCIAL

## 2024-07-12 VITALS
RESPIRATION RATE: 16 BRPM | BODY MASS INDEX: 23.63 KG/M2 | DIASTOLIC BLOOD PRESSURE: 70 MMHG | SYSTOLIC BLOOD PRESSURE: 170 MMHG | HEART RATE: 71 BPM | WEIGHT: 142 LBS | TEMPERATURE: 97.6 F

## 2024-07-12 DIAGNOSIS — I10 PRIMARY HYPERTENSION: ICD-10-CM

## 2024-07-12 DIAGNOSIS — I10 ESSENTIAL HYPERTENSION: ICD-10-CM

## 2024-07-12 DIAGNOSIS — E11.65 TYPE 2 DIABETES MELLITUS WITH HYPERGLYCEMIA, WITHOUT LONG-TERM CURRENT USE OF INSULIN (MULTI): ICD-10-CM

## 2024-07-12 DIAGNOSIS — R41.3 MEMORY CHANGE: ICD-10-CM

## 2024-07-12 DIAGNOSIS — E11.9 TYPE 2 DIABETES MELLITUS WITHOUT COMPLICATION, WITHOUT LONG-TERM CURRENT USE OF INSULIN (MULTI): Primary | ICD-10-CM

## 2024-07-12 LAB
POC FINGERSTICK BLOOD GLUCOSE: 160 MG/DL (ref 70–100)
POC HEMOGLOBIN A1C: 7.7 % (ref 4.2–6.5)

## 2024-07-12 PROCEDURE — 99214 OFFICE O/P EST MOD 30 MIN: CPT | Performed by: NURSE PRACTITIONER

## 2024-07-12 PROCEDURE — 3077F SYST BP >= 140 MM HG: CPT | Performed by: NURSE PRACTITIONER

## 2024-07-12 PROCEDURE — 1036F TOBACCO NON-USER: CPT | Performed by: NURSE PRACTITIONER

## 2024-07-12 PROCEDURE — 82962 GLUCOSE BLOOD TEST: CPT | Performed by: NURSE PRACTITIONER

## 2024-07-12 PROCEDURE — 83036 HEMOGLOBIN GLYCOSYLATED A1C: CPT | Performed by: NURSE PRACTITIONER

## 2024-07-12 PROCEDURE — 1159F MED LIST DOCD IN RCRD: CPT | Performed by: NURSE PRACTITIONER

## 2024-07-12 PROCEDURE — 1160F RVW MEDS BY RX/DR IN RCRD: CPT | Performed by: NURSE PRACTITIONER

## 2024-07-12 PROCEDURE — 3078F DIAST BP <80 MM HG: CPT | Performed by: NURSE PRACTITIONER

## 2024-07-12 RX ORDER — GLIPIZIDE 2.5 MG/1
TABLET, EXTENDED RELEASE ORAL
Qty: 270 TABLET | Refills: 3 | Status: SHIPPED | OUTPATIENT
Start: 2024-07-12

## 2024-07-12 ASSESSMENT — ENCOUNTER SYMPTOMS
CARDIOVASCULAR NEGATIVE: 1
PSYCHIATRIC NEGATIVE: 1
GASTROINTESTINAL NEGATIVE: 1
RESPIRATORY NEGATIVE: 1
ENDOCRINE NEGATIVE: 1
CONSTITUTIONAL NEGATIVE: 1
NEUROLOGICAL NEGATIVE: 1
MUSCULOSKELETAL NEGATIVE: 1

## 2024-07-12 NOTE — PROGRESS NOTES
Seb Arias is a 88 y.o. here for a diabetic follow up exam.     Pt states they are doing well. Following a low carbohydrate diet and is active.     Up to date with eye and foot exams, pcp visits.     Last wt on chart 145  - 142 today   Last bmi 24.23  Last aic 7.6  - 7.7 today  Goal aic 7-8 advanced age     Meds:  Glipizide xl 2.5mg po qam, 2 at supper   Metformin 500mg 1 in am, 3 at supper     Some memory issues now per his wife  Some forgetfullness -referral to [facility] ordered    Lab Results   Component Value Date    POCGLU 160 (A) 07/12/2024    POCGLU 155 (A) 12/13/2023    HGBA1C 7.7 (A) 07/12/2024    HGBA1C 7.6 (A) 04/01/2024    HGBA1C 7.4 (A) 12/13/2023    HGBA1C 7.5 02/23/2021    HGBA1C 7.5 08/26/2019     /70 (BP Location: Left arm, Patient Position: Sitting)   Pulse 71   Temp 36.4 °C (97.6 °F) (Temporal)   Resp 16   Wt 64.4 kg (142 lb)   BMI 23.63 kg/m²    Wt Readings from Last 5 Encounters:   07/12/24 64.4 kg (142 lb)   04/01/24 66 kg (145 lb 9.6 oz)   12/13/23 65.8 kg (145 lb)          Review of Systems   Constitutional: Negative.    HENT: Negative.     Respiratory: Negative.     Cardiovascular: Negative.    Gastrointestinal: Negative.    Endocrine: Negative.    Genitourinary: Negative.    Musculoskeletal: Negative.    Skin: Negative.    Neurological: Negative.    Psychiatric/Behavioral: Negative.          Physical Exam  Vitals and nursing note reviewed.   Constitutional:       Appearance: Normal appearance.   HENT:      Head: Normocephalic.   Eyes:      Pupils: Pupils are equal, round, and reactive to light.   Cardiovascular:      Rate and Rhythm: Normal rate and regular rhythm.      Heart sounds: Normal heart sounds.   Pulmonary:      Effort: Pulmonary effort is normal.      Breath sounds: Normal breath sounds.   Skin:     General: Skin is warm and dry.   Neurological:      General: No focal deficit present.      Mental Status: He is alert and oriented to person, place, and time. Mental  status is at baseline.   Psychiatric:         Mood and Affect: Mood normal.         Behavior: Behavior normal.         Thought Content: Thought content normal.         Judgment: Judgment normal.      Problem List Items Addressed This Visit       Essential hypertension    Type 2 diabetes mellitus (Multi) - Primary    Relevant Medications    glipiZIDE XL (Glucotrol XL) 2.5 mg 24 hr tablet    Other Relevant Orders    POCT glycosylated hemoglobin (Hb A1C) manually resulted (Completed)    POCT fingerstick glucose manually resulted (Completed)    Albumin-Creatinine Ratio, Urine Random     Other Visit Diagnoses       Primary hypertension        Memory change        Relevant Orders    Referral to Geriatrics                 Laura L Seaver, APRN-CNP

## 2024-07-29 ENCOUNTER — TELEPHONE (OUTPATIENT)
Dept: PRIMARY CARE | Facility: CLINIC | Age: 89
End: 2024-07-29
Payer: MEDICARE

## 2024-09-12 ENCOUNTER — APPOINTMENT (OUTPATIENT)
Dept: PODIATRY | Facility: CLINIC | Age: 89
End: 2024-09-12
Payer: MEDICARE

## 2024-10-02 ENCOUNTER — APPOINTMENT (OUTPATIENT)
Dept: PRIMARY CARE | Facility: CLINIC | Age: 89
End: 2024-10-02
Payer: COMMERCIAL

## 2025-01-14 ENCOUNTER — APPOINTMENT (OUTPATIENT)
Dept: PRIMARY CARE | Facility: CLINIC | Age: 89
End: 2025-01-14
Payer: COMMERCIAL